# Patient Record
Sex: FEMALE | Race: WHITE | NOT HISPANIC OR LATINO | URBAN - METROPOLITAN AREA
[De-identification: names, ages, dates, MRNs, and addresses within clinical notes are randomized per-mention and may not be internally consistent; named-entity substitution may affect disease eponyms.]

---

## 2017-07-18 ENCOUNTER — FOLLOW UP ESTABLISHED (OUTPATIENT)
Dept: URBAN - METROPOLITAN AREA CLINIC 64 | Facility: CLINIC | Age: 22
End: 2017-07-18
Payer: MEDICAID

## 2017-07-18 DIAGNOSIS — H40.013 OPEN ANGLE WITH BORDERLINE FINDINGS, LOW RISK, BILATERAL: Primary | ICD-10-CM

## 2017-07-18 PROCEDURE — 92014 COMPRE OPH EXAM EST PT 1/>: CPT | Performed by: OPTOMETRIST

## 2017-07-18 PROCEDURE — 92133 CPTRZD OPH DX IMG PST SGM ON: CPT | Performed by: OPTOMETRIST

## 2017-07-18 ASSESSMENT — INTRAOCULAR PRESSURE
OD: 9
OS: 14

## 2017-07-18 ASSESSMENT — KERATOMETRY
OS: 44.56
OD: 44.27

## 2017-07-18 ASSESSMENT — VISUAL ACUITY
OD: 20/25
OS: 20/30

## 2018-07-19 ENCOUNTER — FOLLOW UP ESTABLISHED (OUTPATIENT)
Dept: URBAN - METROPOLITAN AREA CLINIC 64 | Facility: CLINIC | Age: 23
End: 2018-07-19
Payer: MEDICAID

## 2018-07-19 DIAGNOSIS — H52.03 HYPERMETROPIA, BILATERAL: ICD-10-CM

## 2018-07-19 PROCEDURE — 92015 DETERMINE REFRACTIVE STATE: CPT | Performed by: OPTOMETRIST

## 2018-07-19 PROCEDURE — 92014 COMPRE OPH EXAM EST PT 1/>: CPT | Performed by: OPTOMETRIST

## 2018-07-19 ASSESSMENT — KERATOMETRY
OS: 44.50
OD: 44.17

## 2018-07-19 ASSESSMENT — INTRAOCULAR PRESSURE
OD: 19
OS: 15

## 2018-07-19 ASSESSMENT — VISUAL ACUITY
OS: 20/25
OD: 20/25

## 2022-07-27 ENCOUNTER — OFFICE VISIT (OUTPATIENT)
Dept: URBAN - METROPOLITAN AREA CLINIC 64 | Facility: CLINIC | Age: 27
End: 2022-07-27
Payer: COMMERCIAL

## 2022-07-27 DIAGNOSIS — H52.03 HYPERMETROPIA, BILATERAL: Primary | ICD-10-CM

## 2022-07-27 DIAGNOSIS — H53.031 STRABISMIC AMBLYOPIA, RIGHT EYE: ICD-10-CM

## 2022-07-27 PROCEDURE — 92004 COMPRE OPH EXAM NEW PT 1/>: CPT | Performed by: OPTOMETRIST

## 2022-07-27 ASSESSMENT — INTRAOCULAR PRESSURE
OD: 15
OS: 13

## 2022-07-27 NOTE — IMPRESSION/PLAN
Impression: Strabismic amblyopia, right eye OS Condition: established, stable. Vision: vision affected.  Plan: polycarb lenses, eye protection

## 2023-10-05 ENCOUNTER — OFFICE VISIT (OUTPATIENT)
Dept: URBAN - METROPOLITAN AREA CLINIC 64 | Facility: LOCATION | Age: 28
End: 2023-10-05
Payer: COMMERCIAL

## 2023-10-05 DIAGNOSIS — H52.13 MYOPIA, BILATERAL: ICD-10-CM

## 2023-10-05 DIAGNOSIS — H53.031 STRABISMIC AMBLYOPIA, RIGHT EYE: Primary | ICD-10-CM

## 2023-10-05 PROCEDURE — 99214 OFFICE O/P EST MOD 30 MIN: CPT | Performed by: OPTOMETRIST

## 2023-10-05 ASSESSMENT — VISUAL ACUITY
OD: 20/40
OS: 20/40

## 2023-10-05 ASSESSMENT — KERATOMETRY
OS: 44.38
OD: 44.18

## 2025-06-03 ENCOUNTER — OFFICE VISIT (OUTPATIENT)
Dept: URBAN - METROPOLITAN AREA CLINIC 64 | Facility: LOCATION | Age: 30
End: 2025-06-03
Payer: MEDICAID

## 2025-06-03 DIAGNOSIS — Q14.8 COLOBOMA OF THE FUNDUS: Primary | ICD-10-CM

## 2025-06-03 DIAGNOSIS — H52.13 MYOPIA, BILATERAL: ICD-10-CM

## 2025-06-03 PROCEDURE — 99214 OFFICE O/P EST MOD 30 MIN: CPT | Performed by: OPTOMETRIST
